# Patient Record
Sex: FEMALE | HISPANIC OR LATINO | Employment: UNEMPLOYED | URBAN - METROPOLITAN AREA
[De-identification: names, ages, dates, MRNs, and addresses within clinical notes are randomized per-mention and may not be internally consistent; named-entity substitution may affect disease eponyms.]

---

## 2023-09-30 ENCOUNTER — APPOINTMENT (OUTPATIENT)
Dept: GENERAL RADIOLOGY | Facility: CLINIC | Age: 23
End: 2023-09-30
Attending: PHYSICIAN ASSISTANT
Payer: COMMERCIAL

## 2023-09-30 ENCOUNTER — HOSPITAL ENCOUNTER (EMERGENCY)
Facility: CLINIC | Age: 23
Discharge: HOME | End: 2023-09-30
Attending: PHYSICIAN ASSISTANT | Admitting: PHYSICIAN ASSISTANT
Payer: COMMERCIAL

## 2023-09-30 VITALS
DIASTOLIC BLOOD PRESSURE: 70 MMHG | TEMPERATURE: 97.2 F | RESPIRATION RATE: 16 BRPM | HEART RATE: 73 BPM | OXYGEN SATURATION: 100 % | SYSTOLIC BLOOD PRESSURE: 106 MMHG

## 2023-09-30 DIAGNOSIS — M54.6 ACUTE MIDLINE THORACIC BACK PAIN: ICD-10-CM

## 2023-09-30 DIAGNOSIS — M54.42 ACUTE MIDLINE LOW BACK PAIN WITH LEFT-SIDED SCIATICA: ICD-10-CM

## 2023-09-30 LAB — HCG UR QL: NEGATIVE

## 2023-09-30 PROCEDURE — 250N000013 HC RX MED GY IP 250 OP 250 PS 637: Performed by: PHYSICIAN ASSISTANT

## 2023-09-30 PROCEDURE — 72100 X-RAY EXAM L-S SPINE 2/3 VWS: CPT

## 2023-09-30 PROCEDURE — 81025 URINE PREGNANCY TEST: CPT | Performed by: PHYSICIAN ASSISTANT

## 2023-09-30 PROCEDURE — 99284 EMERGENCY DEPT VISIT MOD MDM: CPT

## 2023-09-30 PROCEDURE — 72070 X-RAY EXAM THORAC SPINE 2VWS: CPT

## 2023-09-30 RX ORDER — ACETAMINOPHEN 500 MG
1000 TABLET ORAL ONCE
Status: COMPLETED | OUTPATIENT
Start: 2023-09-30 | End: 2023-09-30

## 2023-09-30 RX ORDER — CYCLOBENZAPRINE HCL 10 MG
10 TABLET ORAL 3 TIMES DAILY PRN
Qty: 15 TABLET | Refills: 0 | Status: SHIPPED | OUTPATIENT
Start: 2023-09-30

## 2023-09-30 RX ADMIN — ACETAMINOPHEN 1000 MG: 500 TABLET, FILM COATED ORAL at 10:21

## 2023-09-30 ASSESSMENT — ACTIVITIES OF DAILY LIVING (ADL): ADLS_ACUITY_SCORE: 33

## 2023-09-30 NOTE — ED PROVIDER NOTES
History     Chief Complaint:  Back Pain       The history is provided by the patient. A  was used ([place]).      Jazmine Monique is a 23 year old female who presents with back pain. She states that she began to develop low to mid back pain 2 weeks ago. She states that she has been lifting heavy objects at work and thinks the pain is from that but denies lifting anything directly before pain started. She has been taking Ibuprofen for the pain. She does endorse some numbness and tingling in her left leg. She denies any trauma, numbness to groin, urinary symptoms, incontinence, or rash.    Independent Historian:   None - Patient Only    Review of External Notes:   None      Medications:    The patient is not taking any routine medications      Past Medical History:    The patient denies any past medical history      Past Surgical History:    None      Physical Exam   Patient Vitals for the past 24 hrs:   BP Temp Temp src Pulse Resp SpO2   09/30/23 0825 106/70 97.2  F (36.2  C) Temporal 73 16 100 %        Physical Exam  Constitutional: Pleasant. Cooperative.   Eyes: Pupils equally round and reactive  HENT: Head is normal in appearance. Oropharynx is normal with moist mucus membranes.  Cardiovascular: Regular rate and rhythm and without murmurs.  Respiratory: Normal respiratory effort, lungs are clear bilaterally.  Musculoskeletal: TTP to lower T spine in midline as well as lower L spine in midline. 5/5 strength with hip flexion, knee flexion, knee extension, dorsiflexion, and plantarflexion bilaterally.   Skin: Normal, without rash.  Neurologic: Cranial nerves grossly intact, normal cognition, no focal deficits. Alert and oriented x 3.  Sensation to light touch intact in bilateral lower extremities. Normal gait.  Psychiatric: Normal affect.  Nursing notes and vital signs reviewed.     Emergency Department Course     Imaging:  XR Thoracic Spine 2 Views   Final Result   There are 12 rib-bearing  thoracic-type vertebral bodies with mildly hypoplastic 12th ribs noted. Normal vertebral body heights and lateral alignment are maintained. Mild broad thoracic levocurvature, apex at T8-T9, likely with a   positional component, as this is less pronounced on accompanying AP lumbar spine radiograph. No radiographic evidence for acute fracture or traumatic subluxation. No significant degenerative changes identified. Because ribs, lung fields, and soft tissues   appear unremarkable.      Lumbar spine XR, 2-3 views   Final Result   There is transitional lumbosacral segmentation with sacralization of L5, with rudimentary immobile L5-S1 disc space and bilateral L5-S1 pseudoarthroses, right greater than left. Normal vertebral body heights and alignment are maintained. No   radiographic evidence for acute fracture, traumatic subluxation, or pars defect. Fully developed intervertebral disc spaces and facet joints appear well-maintained. Mild sclerotic changes along the bilateral sacroiliac joints, without ankylosis or joint   space irregularity. Soft tissues appear unremarkable.       Report per radiology     Laboratory:  Labs Ordered and Resulted from Time of ED Arrival to Time of ED Departure   HCG QUALITATIVE URINE - Normal       Result Value    hCG Urine Qualitative Negative        Emergency Department Course & Assessments:       Interventions:  Medications   acetaminophen (TYLENOL) tablet 1,000 mg (1,000 mg Oral $Given 9/30/23 1021)      Assessments:  1002 I consulted with the patient and obtained history as shown above  1225 I rechecked the patient and explained exam results     Independent Interpretation (X-rays, CTs, rhythm strip):  None    Consultations/Discussion of Management or Tests:  None     Social Determinants of Health affecting care:   None    Disposition:  The patient was discharged to home.     Impression & Plan    CMS Diagnoses: None    Medical Decision Making:  Jazmine Monique is a 23 year old  female who presents to ED for evaluation of back pain after lifting a heavy object at work about 2 weeks ago.  No falls or trauma.  See HPI as above for additional details.  Vitals and physical exam as above.  Differentials broad included cauda equina, fracture, strain, disc pathology, pyelonephritis, kidney stone, amongst others.  No red flag signs or symptoms to suggest for cauda equina.  X-rays negative for acute bony abnormality.  No urinary symptoms to suggest for pyelonephritis or kidney stone.  Suspect strain versus disc pathology.  Plan for Tylenol, ibuprofen, Flexeril as below.  Advise follow-up with PCP or orthopedics with persistent symptoms.  [place] patient was safe for discharge to home. Discussed reasons to return. All questions answered. Patient discharged to home in stable condition.    Diagnosis:    ICD-10-CM    1. Acute midline low back pain with left-sided sciatica  M54.42       2. Acute midline thoracic back pain  M54.6            Discharge Medications:  New Prescriptions    CYCLOBENZAPRINE (FLEXERIL) 10 MG TABLET    Take 1 tablet (10 mg) by mouth 3 times daily as needed for muscle spasms      Scribe Disclosure:  I, Des Alvarez, am serving as a scribe at 9:28 AM on 9/30/2023 to document services personally performed by Osmin Griffin PA-C based on my observations and the provider's statements to me.     9/30/2023   Osmin Griffin PA-C     This record was created at least in part using electronic voice recognition software, so please excuse any typographical errors.         Osmin Griffin PA-C  09/30/23 [id number]

## 2023-09-30 NOTE — ED TRIAGE NOTES
Pt presents to the ED with complaint of lower middle back pain for 2 weeks. Pt states she was lifting a lot of weight at work when it started. Pain 9/10.      Triage Assessment       Row Name 09/30/23 0826       Triage Assessment (Adult)    Airway WDL WDL       Respiratory WDL    Respiratory WDL WDL       Skin Circulation/Temperature WDL    Skin Circulation/Temperature WDL WDL       Cardiac WDL    Cardiac WDL WDL       Peripheral/Neurovascular WDL    Peripheral Neurovascular WDL WDL       Cognitive/Neuro/Behavioral WDL    Cognitive/Neuro/Behavioral WDL WDL

## 2023-09-30 NOTE — LETTER
September 30, 2023      To Whom It May Concern:      Jazmine Monique was seen in our Emergency Department today, 09/30/23.  I expect her condition to improve over the next 2-3 days.  Additionally, she should minimize heavy lifting over the next week while she recovers.    Sincerely,        Osmin Griffin PA-C

## 2023-09-30 NOTE — DISCHARGE INSTRUCTIONS
For pain, you may take Tylenol 1000mg every 8 hours and ibuprofen 400mg every 6 hours for pain.  Use Flexeril to help with back pain.

## 2024-10-22 ENCOUNTER — TRANSFERRED RECORDS (OUTPATIENT)
Dept: HEALTH INFORMATION MANAGEMENT | Facility: CLINIC | Age: 24
End: 2024-10-22
Payer: COMMERCIAL

## 2024-10-22 ENCOUNTER — MEDICAL CORRESPONDENCE (OUTPATIENT)
Dept: HEALTH INFORMATION MANAGEMENT | Facility: CLINIC | Age: 24
End: 2024-10-22
Payer: COMMERCIAL

## 2024-10-23 ENCOUNTER — TRANSCRIBE ORDERS (OUTPATIENT)
Dept: MATERNAL FETAL MEDICINE | Facility: CLINIC | Age: 24
End: 2024-10-23
Payer: COMMERCIAL

## 2024-10-23 ENCOUNTER — PRE VISIT (OUTPATIENT)
Dept: MATERNAL FETAL MEDICINE | Facility: CLINIC | Age: 24
End: 2024-10-23
Payer: COMMERCIAL

## 2024-10-23 DIAGNOSIS — O26.90 PREGNANCY RELATED CONDITION, ANTEPARTUM: Primary | ICD-10-CM

## 2024-10-24 ENCOUNTER — OFFICE VISIT (OUTPATIENT)
Dept: INTERPRETER SERVICES | Facility: CLINIC | Age: 24
End: 2024-10-24

## 2024-10-24 ENCOUNTER — HOSPITAL ENCOUNTER (OUTPATIENT)
Dept: ULTRASOUND IMAGING | Facility: CLINIC | Age: 24
Discharge: HOME | End: 2024-10-24
Attending: STUDENT IN AN ORGANIZED HEALTH CARE EDUCATION/TRAINING PROGRAM
Payer: COMMERCIAL

## 2024-10-24 ENCOUNTER — OFFICE VISIT (OUTPATIENT)
Dept: MATERNAL FETAL MEDICINE | Facility: CLINIC | Age: 24
End: 2024-10-24
Attending: STUDENT IN AN ORGANIZED HEALTH CARE EDUCATION/TRAINING PROGRAM
Payer: COMMERCIAL

## 2024-10-24 DIAGNOSIS — O26.90 PREGNANCY RELATED CONDITION, ANTEPARTUM: ICD-10-CM

## 2024-10-24 DIAGNOSIS — O09.33 INSUFFICIENT PRENATAL CARE IN THIRD TRIMESTER: Primary | ICD-10-CM

## 2024-10-24 PROCEDURE — 76811 OB US DETAILED SNGL FETUS: CPT

## 2024-10-24 PROCEDURE — 99203 OFFICE O/P NEW LOW 30 MIN: CPT | Mod: 25 | Performed by: STUDENT IN AN ORGANIZED HEALTH CARE EDUCATION/TRAINING PROGRAM

## 2024-10-24 PROCEDURE — T1013 SIGN LANG/ORAL INTERPRETER: HCPCS | Mod: GT

## 2024-10-24 PROCEDURE — 76811 OB US DETAILED SNGL FETUS: CPT | Mod: 26 | Performed by: STUDENT IN AN ORGANIZED HEALTH CARE EDUCATION/TRAINING PROGRAM

## 2024-10-24 NOTE — NURSING NOTE
Patient reports positive fetal movement, denies pain,  contractions, leaking of fluid, or bleeding.  Education provided to patient on todays US.  SBAR given to JUAN PABLO LEHMAN, see their note in Epic.  [place] interpeter

## 2024-10-24 NOTE — PROGRESS NOTES
"Please see \"Imaging\" tab under \"Chart Review\" for details of today's visit.    China Gusman    "